# Patient Record
Sex: FEMALE | Race: WHITE | NOT HISPANIC OR LATINO | Employment: FULL TIME | URBAN - METROPOLITAN AREA
[De-identification: names, ages, dates, MRNs, and addresses within clinical notes are randomized per-mention and may not be internally consistent; named-entity substitution may affect disease eponyms.]

---

## 2023-04-09 PROBLEM — N89.8 VAGINAL PRURITUS: Status: ACTIVE | Noted: 2023-04-09

## 2023-04-09 PROBLEM — N95.1 MENOPAUSAL SYMPTOMS: Status: ACTIVE | Noted: 2023-04-09

## 2023-04-09 PROBLEM — L30.9 ECZEMA: Status: ACTIVE | Noted: 2023-04-09

## 2023-04-09 PROBLEM — E34.9 TESTOSTERONE DEFICIENCY: Status: ACTIVE | Noted: 2023-04-09

## 2023-04-09 PROBLEM — G47.00 INSOMNIA: Status: ACTIVE | Noted: 2023-04-09

## 2023-04-09 PROBLEM — R51.9 HEADACHE: Status: ACTIVE | Noted: 2023-04-09

## 2023-04-09 PROBLEM — K58.9 IRRITABLE BOWEL SYNDROME: Status: ACTIVE | Noted: 2023-04-09

## 2023-04-09 PROBLEM — R79.89 ELEVATED TESTOSTERONE LEVEL: Status: ACTIVE | Noted: 2023-04-09

## 2023-04-09 PROBLEM — N76.0 VULVOVAGINITIS: Status: ACTIVE | Noted: 2023-04-09

## 2023-04-09 PROBLEM — J45.909 REACTIVE AIRWAY DISEASE (HHS-HCC): Status: ACTIVE | Noted: 2023-04-09

## 2023-04-09 PROBLEM — N95.2 VAGINAL ATROPHY: Status: ACTIVE | Noted: 2023-04-09

## 2023-04-09 PROBLEM — R23.2 HOT FLASHES: Status: ACTIVE | Noted: 2023-04-09

## 2023-04-09 PROBLEM — R10.2 VAGINAL PAIN: Status: ACTIVE | Noted: 2023-04-09

## 2023-04-09 PROBLEM — L23.7 CONTACT DERMATITIS DUE TO POISON IVY: Status: ACTIVE | Noted: 2023-04-09

## 2023-04-09 PROBLEM — R10.2 VULVAR PAIN: Status: ACTIVE | Noted: 2023-04-09

## 2023-04-09 PROBLEM — L28.0 LICHEN SIMPLEX, CHRONIC: Status: ACTIVE | Noted: 2023-04-09

## 2023-04-09 RX ORDER — ESZOPICLONE 2 MG/1
1 TABLET, FILM COATED ORAL NIGHTLY PRN
COMMUNITY
Start: 2017-06-02 | End: 2023-04-11 | Stop reason: SDUPTHER

## 2023-04-09 RX ORDER — ITRACONAZOLE 100 MG/1
200 CAPSULE ORAL
COMMUNITY
Start: 2018-10-06

## 2023-04-09 RX ORDER — PRASTERONE 6.5 MG/1
1 INSERT VAGINAL NIGHTLY
COMMUNITY
Start: 2021-01-15

## 2023-04-09 RX ORDER — LEVOCETIRIZINE DIHYDROCHLORIDE 5 MG/1
1 TABLET, FILM COATED ORAL EVERY EVENING
COMMUNITY
Start: 2022-10-07 | End: 2023-10-30

## 2023-04-10 ENCOUNTER — OFFICE VISIT (OUTPATIENT)
Dept: PRIMARY CARE | Facility: CLINIC | Age: 58
End: 2023-04-10
Payer: COMMERCIAL

## 2023-04-10 VITALS
BODY MASS INDEX: 20.5 KG/M2 | SYSTOLIC BLOOD PRESSURE: 110 MMHG | RESPIRATION RATE: 18 BRPM | WEIGHT: 130.6 LBS | HEIGHT: 67 IN | OXYGEN SATURATION: 99 % | HEART RATE: 74 BPM | DIASTOLIC BLOOD PRESSURE: 66 MMHG

## 2023-04-10 DIAGNOSIS — L30.9 ECZEMA, UNSPECIFIED TYPE: ICD-10-CM

## 2023-04-10 DIAGNOSIS — G47.00 INSOMNIA, UNSPECIFIED TYPE: Primary | ICD-10-CM

## 2023-04-10 PROCEDURE — 1036F TOBACCO NON-USER: CPT | Performed by: NURSE PRACTITIONER

## 2023-04-10 PROCEDURE — 99214 OFFICE O/P EST MOD 30 MIN: CPT | Performed by: NURSE PRACTITIONER

## 2023-04-10 PROCEDURE — 80307 DRUG TEST PRSMV CHEM ANLYZR: CPT

## 2023-04-10 RX ORDER — METHYLPREDNISOLONE 4 MG/1
TABLET ORAL
Qty: 21 TABLET | Refills: 0 | Status: SHIPPED | OUTPATIENT
Start: 2023-04-10 | End: 2023-04-17

## 2023-04-10 NOTE — PROGRESS NOTES
Subjective   Patient ID: Nelly Lewis is a 57 y.o. female who presents for Rash (Patient in today with concerns of eczema on her face and medication refills. 130).    Had hysterectomy in Dec   Follows bio identical provider to help regulate her estrogen and hormone levels    Will be traveling for work again after not being able to travel due to COVID.  She has to travel to [place].  She previously would have to take lunesta for sleep time change.  She will need refill    She has had eczema to her forearm and face.  She has tried desonide in the past but ran out and would like refill   She has had increased stress and feels this is making her eczema worse    OARRS:  No data recorded  I have personally reviewed the OARRS report for Nelly Lewis. I have considered the risks of abuse, dependence, addiction and diversion    Is the patient prescribed a combination of a benzodiazepine and opioid?  No    Last Urine Drug Screen / ordered today: Yes  Recent Results (from the past 36182 hour(s))   Drug Screen, Urine With Reflex to Confirmation    Collection Time: 04/10/23 12:37 PM   Result Value Ref Range    DRUG SCREEN COMMENT URINE SEE BELOW     Amphetamine Screen, Urine PRESUMPTIVE NEGATIVE NEGATIVE    Barbiturate Screen, Urine PRESUMPTIVE NEGATIVE NEGATIVE    BENZODIAZEPINE (PRESENCE) IN URINE BY SCREEN METHOD PRESUMPTIVE NEGATIVE NEGATIVE    Cannabinoid Screen, Urine PRESUMPTIVE NEGATIVE NEGATIVE    Cocaine Screen, Urine PRESUMPTIVE NEGATIVE NEGATIVE    Fentanyl, Ur PRESUMPTIVE NEGATIVE NEGATIVE    Methadone Screen, Urine PRESUMPTIVE NEGATIVE NEGATIVE    Opiate Screen, Urine PRESUMPTIVE NEGATIVE NEGATIVE    Oxycodone Screen, Ur PRESUMPTIVE NEGATIVE NEGATIVE    PCP Screen, Urine PRESUMPTIVE NEGATIVE NEGATIVE     Results are as expected.     Controlled Substance Agreement:  Date of the Last Agreement: 4/10/23  Reviewed Controlled Substance Agreement including but not limited to the benefits, risks, and alternatives  "to treatment with a Controlled Substance medication(s).    Sleep Aids:   What is the patient's goal of therapy? Increased sleep  Is this being achieved with current treatment? yes    Activities of Daily Living:   Is your overall impression that this patient is benefiting (symptom reduction outweighs side effects) from sleep aid therapy? Yes     1. Physical Functioning: Better  2. Family Relationship: Better  3. Social Relationship: Better  4. Mood: Better  5. Sleep Patterns: Better  6. Overall Function: Better       Review of Systems   Skin:  Positive for rash.   All other systems reviewed and are negative.      Objective   /66   Pulse 74   Resp 18   Ht 1.702 m (5' 7\")   Wt 59.2 kg (130 lb 9.6 oz)   SpO2 99%   BMI 20.45 kg/m²     Physical Exam  Vitals and nursing note reviewed.   Constitutional:       Appearance: Normal appearance. She is normal weight.   HENT:      Head: Normocephalic and atraumatic.      Right Ear: External ear normal.      Left Ear: External ear normal.   Eyes:      Extraocular Movements: Extraocular movements intact.      Conjunctiva/sclera: Conjunctivae normal.      Pupils: Pupils are equal, round, and reactive to light.   Neck:      Vascular: No carotid bruit.   Cardiovascular:      Rate and Rhythm: Normal rate and regular rhythm.      Pulses: Normal pulses.      Heart sounds: Normal heart sounds.   Pulmonary:      Effort: Pulmonary effort is normal.      Breath sounds: Normal breath sounds.   Abdominal:      General: Bowel sounds are normal.   Musculoskeletal:      Cervical back: Normal range of motion.   Lymphadenopathy:      Cervical: No cervical adenopathy.   Skin:     General: Skin is warm and dry.      Comments: Eczema to bilateral forearms and cheeks   Neurological:      General: No focal deficit present.      Mental Status: She is alert.   Psychiatric:         Mood and Affect: Mood normal.         Behavior: Behavior normal.         Thought Content: Thought content normal.   "       Judgment: Judgment normal.         Assessment/Plan   Problem List Items Addressed This Visit          Nervous    Insomnia - Primary     - I have personally reviewed the OARRS report for the patient. This report is scanned into the electronic medical record. I have considered the risks of abuse, dependence, addiction and diversion. I believe that it is clinically appropriate for the patient to be prescribed this medication  -  controlled substance contract reviewed and signed  [facility]  - UDS today  - follow up 6 months          Relevant Orders    POCT waived urine drug screen manually resulted    Drug Screen, Urine With Reflex to Confirmation (Completed)       Infectious/Inflammatory    Eczema     Medrol dose pack  Desonide for face         Relevant Medications    methylPREDNISolone (Medrol Dospak) 4 mg tablets

## 2023-04-11 PROBLEM — R51.9 HEADACHE: Status: RESOLVED | Noted: 2023-04-09 | Resolved: 2023-04-11

## 2023-04-11 PROBLEM — N76.0 VULVOVAGINITIS: Status: RESOLVED | Noted: 2023-04-09 | Resolved: 2023-04-11

## 2023-04-11 PROBLEM — L23.7 CONTACT DERMATITIS DUE TO POISON IVY: Status: RESOLVED | Noted: 2023-04-09 | Resolved: 2023-04-11

## 2023-04-11 PROBLEM — J45.909 REACTIVE AIRWAY DISEASE (HHS-HCC): Status: RESOLVED | Noted: 2023-04-09 | Resolved: 2023-04-11

## 2023-04-11 PROBLEM — N89.8 VAGINAL PRURITUS: Status: RESOLVED | Noted: 2023-04-09 | Resolved: 2023-04-11

## 2023-04-11 PROBLEM — R79.89 ELEVATED TESTOSTERONE LEVEL: Status: RESOLVED | Noted: 2023-04-09 | Resolved: 2023-04-11

## 2023-04-11 PROBLEM — R23.2 HOT FLASHES: Status: RESOLVED | Noted: 2023-04-09 | Resolved: 2023-04-11

## 2023-04-11 PROBLEM — R10.2 VAGINAL PAIN: Status: RESOLVED | Noted: 2023-04-09 | Resolved: 2023-04-11

## 2023-04-11 LAB
AMPHETAMINE (PRESENCE) IN URINE BY SCREEN METHOD: NORMAL
BARBITURATES PRESENCE IN URINE BY SCREEN METHOD: NORMAL
BENZODIAZEPINE (PRESENCE) IN URINE BY SCREEN METHOD: NORMAL
CANNABINOIDS IN URINE BY SCREEN METHOD: NORMAL
COCAINE (PRESENCE) IN URINE BY SCREEN METHOD: NORMAL
DRUG SCREEN COMMENT URINE: NORMAL
FENTANYL URINE: NORMAL
METHADONE (PRESENCE) IN URINE BY SCREEN METHOD: NORMAL
OPIATES (PRESENCE) IN URINE BY SCREEN METHOD: NORMAL
OXYCODONE (PRESENCE) IN URINE BY SCREEN METHOD: NORMAL
PHENCYCLIDINE (PRESENCE) IN URINE BY SCREEN METHOD: NORMAL

## 2023-04-11 RX ORDER — DESONIDE 0.5 MG/G
CREAM TOPICAL 2 TIMES DAILY
Qty: 60 G | Refills: 5 | Status: SHIPPED | OUTPATIENT
Start: 2023-04-11 | End: 2023-11-09

## 2023-04-11 RX ORDER — ESZOPICLONE 2 MG/1
2 TABLET, FILM COATED ORAL NIGHTLY PRN
Qty: 30 TABLET | Refills: 0 | Status: SHIPPED | OUTPATIENT
Start: 2023-04-11 | End: 2023-05-11

## 2023-04-11 NOTE — ASSESSMENT & PLAN NOTE
- I have personally reviewed the OARRS report for the patient. This report is scanned into the electronic medical record. I have considered the risks of abuse, dependence, addiction and diversion. I believe that it is clinically appropriate for the patient to be prescribed this medication  -  controlled substance contract reviewed and signed  - lunesta  - S today  - follow up 6 months

## 2023-10-29 DIAGNOSIS — Z00.00 ENCOUNTER FOR GENERAL ADULT MEDICAL EXAMINATION WITHOUT ABNORMAL FINDINGS: ICD-10-CM

## 2023-10-30 RX ORDER — LEVOCETIRIZINE DIHYDROCHLORIDE 5 MG/1
5 TABLET, FILM COATED ORAL EVERY EVENING
Qty: 30 TABLET | Refills: 0 | Status: SHIPPED | OUTPATIENT
Start: 2023-10-30

## 2023-11-09 ENCOUNTER — APPOINTMENT (OUTPATIENT)
Dept: PRIMARY CARE | Facility: CLINIC | Age: 58
End: 2023-11-09
Payer: COMMERCIAL

## 2023-11-09 ENCOUNTER — OFFICE VISIT (OUTPATIENT)
Dept: PRIMARY CARE | Facility: CLINIC | Age: 58
End: 2023-11-09
Payer: COMMERCIAL

## 2023-11-09 VITALS
BODY MASS INDEX: 20.72 KG/M2 | HEIGHT: 67 IN | HEART RATE: 80 BPM | DIASTOLIC BLOOD PRESSURE: 65 MMHG | SYSTOLIC BLOOD PRESSURE: 99 MMHG | WEIGHT: 132 LBS

## 2023-11-09 DIAGNOSIS — L20.82 FLEXURAL ECZEMA: Primary | ICD-10-CM

## 2023-11-09 PROCEDURE — 1036F TOBACCO NON-USER: CPT | Performed by: NURSE PRACTITIONER

## 2023-11-09 PROCEDURE — 99212 OFFICE O/P EST SF 10 MIN: CPT | Performed by: NURSE PRACTITIONER

## 2023-11-09 RX ORDER — HYDROCORTISONE 25 MG/G
CREAM TOPICAL 2 TIMES DAILY PRN
Qty: 30 G | Refills: 1 | Status: SHIPPED | OUTPATIENT
Start: 2023-11-09 | End: 2024-11-08

## 2023-11-09 RX ORDER — LEVOTHYROXINE SODIUM 25 UG/1
25 TABLET ORAL DAILY
COMMUNITY

## 2023-11-09 NOTE — PROGRESS NOTES
"Nelly Lewis is a 58 y.o. female pt of YAS Reese who presents today for c/o \"eczema flare up\"    Chief Complaint   Patient presents with    SDS     Nelly who is a [facility] patient is here today with c/o eczema flare up . Florence had given a rx in the past which provided no relief. Pt also has tried many OTC medications with  no relief.        Symptoms: Dry skin/Eczema     Pt states symptoms 1st began on her face last year  Rx DesOwen and it has helped, until recently when she developed mult outbreaks to arms/antecubital, lower back and legs.  + itchiness   Pt would like to try hydrocortisone and will begin taking daily antihistamine (Xyzal) as she has not been doing that     Review of Systems  All 13 systems were reviewed and are within normal limits except positive and pertinent negative responses which are noted below or in HPI.        Objective   Vitals:  BP 99/65   Pulse 80   Ht 1.702 m (5' 7\")   Wt 59.9 kg (132 lb)   BMI 20.67 kg/m²         Physical Exam  Skin:               Assessment/Plan   Problem List Items Addressed This Visit             ICD-10-CM    Eczema - Primary L30.9    Relevant Medications    hydrocortisone 2.5 % cream       "

## 2023-11-09 NOTE — PATIENT INSTRUCTIONS
Thank you for seeing me today.  It was a pleasure to meet you!    #ECZEMA  Begin Hydrocortisone cream; DO NOT use on face or groin  Use Aquaphor/Vaseline to face  Begin taking Xyzal every day    RTC AS NEEDED

## 2024-01-03 DIAGNOSIS — L30.9 ECZEMA, UNSPECIFIED TYPE: ICD-10-CM

## 2024-01-03 RX ORDER — DESONIDE 0.5 MG/G
CREAM TOPICAL 2 TIMES DAILY
Qty: 60 G | Refills: 5 | Status: SHIPPED | OUTPATIENT
Start: 2024-01-03 | End: 2025-01-02

## 2024-06-10 ENCOUNTER — HOSPITAL ENCOUNTER (OUTPATIENT)
Dept: RADIOLOGY | Facility: HOSPITAL | Age: 59
Discharge: HOME | End: 2024-06-10
Payer: COMMERCIAL

## 2024-06-10 VITALS — BODY MASS INDEX: 20.09 KG/M2 | HEIGHT: 67 IN | WEIGHT: 128 LBS

## 2024-06-10 DIAGNOSIS — Z12.31 ENCOUNTER FOR SCREENING MAMMOGRAM FOR MALIGNANT NEOPLASM OF BREAST: ICD-10-CM

## 2024-06-10 PROCEDURE — 77063 BREAST TOMOSYNTHESIS BI: CPT

## 2024-06-10 PROCEDURE — 77067 SCR MAMMO BI INCL CAD: CPT | Performed by: RADIOLOGY

## 2024-06-10 PROCEDURE — 77063 BREAST TOMOSYNTHESIS BI: CPT | Performed by: RADIOLOGY

## 2024-08-06 ENCOUNTER — APPOINTMENT (OUTPATIENT)
Dept: PRIMARY CARE | Facility: CLINIC | Age: 59
End: 2024-08-06
Payer: COMMERCIAL

## 2024-08-06 ENCOUNTER — OFFICE VISIT (OUTPATIENT)
Dept: PRIMARY CARE | Facility: CLINIC | Age: 59
End: 2024-08-06
Payer: COMMERCIAL

## 2024-08-06 VITALS
OXYGEN SATURATION: 97 % | HEIGHT: 67 IN | WEIGHT: 129 LBS | BODY MASS INDEX: 20.25 KG/M2 | SYSTOLIC BLOOD PRESSURE: 102 MMHG | DIASTOLIC BLOOD PRESSURE: 65 MMHG | HEART RATE: 73 BPM

## 2024-08-06 DIAGNOSIS — J06.9 UPPER RESPIRATORY TRACT INFECTION, UNSPECIFIED TYPE: Primary | ICD-10-CM

## 2024-08-06 PROCEDURE — 3008F BODY MASS INDEX DOCD: CPT

## 2024-08-06 PROCEDURE — 1036F TOBACCO NON-USER: CPT

## 2024-08-06 PROCEDURE — 99213 OFFICE O/P EST LOW 20 MIN: CPT

## 2024-08-06 RX ORDER — AZITHROMYCIN 250 MG/1
TABLET, FILM COATED ORAL
Qty: 6 TABLET | Refills: 0 | Status: SHIPPED | OUTPATIENT
Start: 2024-08-06 | End: 2024-08-11

## 2024-08-06 ASSESSMENT — ENCOUNTER SYMPTOMS
SHORTNESS OF BREATH: 1
FEVER: 0
HEMOPTYSIS: 0
COUGH: 1
HEARTBURN: 0
WHEEZING: 0
RHINORRHEA: 1
HEADACHES: 0

## 2024-08-06 NOTE — PROGRESS NOTES
"Subjective   Patient ID: Nelly Lewis is a 59 y.o. female who presents for Cough (Cough with phlegm x 4 days nonproductive cough/Upper respiratory congestion,  has had it for 3 wks from traveling with someone ).    Cough  This is a new problem. Episode onset: 7days. The problem has been gradually worsening. The problem occurs every few minutes. The cough is Productive of purulent sputum (productive). Associated symptoms include rhinorrhea and shortness of breath. Pertinent negatives include no ear congestion, ear pain, fever, headaches, heartburn, hemoptysis, nasal congestion, postnasal drip or wheezing. Exacerbated by: was exposed to uri while traveling. Treatments tried: zinc, vit c, vit d. The treatment provided mild relief. There is no history of emphysema, environmental allergies or pneumonia. does have eczema rash        Review of Systems   Constitutional:  Negative for fever.   HENT:  Positive for rhinorrhea. Negative for ear pain and postnasal drip.    Respiratory:  Positive for cough and shortness of breath. Negative for hemoptysis and wheezing.    Gastrointestinal:  Negative for heartburn.   Allergic/Immunologic: Negative for environmental allergies.   Neurological:  Negative for headaches.       Objective   /65   Pulse 73   Ht 1.702 m (5' 7\")   Wt 58.5 kg (129 lb)   SpO2 97%   BMI 20.20 kg/m²     Physical Exam  Vitals and nursing note reviewed.   Constitutional:       General: She is not in acute distress.     Appearance: Normal appearance. She is not ill-appearing.   HENT:      Nose: Congestion present. No rhinorrhea.   Cardiovascular:      Heart sounds: Normal heart sounds.   Pulmonary:      Breath sounds: Normal breath sounds.   Neurological:      Mental Status: She is alert.         Assessment/Plan   Nelly was seen today for cough.  Diagnoses and all orders for this visit:  Upper respiratory tract infection, unspecified type  -     azithromycin (Zithromax) 250 mg tablet; Take " 2 tablets (500 mg) by mouth once daily for 1 day, THEN 1 tablet (250 mg) once daily for 4 days. Take 2 tabs (500 mg) by mouth today, than 1 daily for 4 days..

## 2024-08-06 NOTE — PATIENT INSTRUCTIONS
Drinking plenty of fluids and getting lots of rest. Chicken soup and hot beverages may help.    Trial of nasal irrigation with a Nettipot or squeeze bottle with sterile salt water.    Nasal spray corticosteroids (Flonase) may help in reducing the inflammatory response in the nasal passages and airways. Please try 2 sprays each nostril daily for 2 weeks.  If you have season allergies, please take a daily antihistamine of your choice, such as Zyrtec/Claritin/Allegra at bedtime.    Take Tylenol or Motrin/Aleve for sinus or ear pain.    If you have good blood pressure you can try pseudofed (you must ask the pharmacist for it and show ID).    Please call or return to the office if you are not feeling better in the next 3-4 days after starting treatment.    Over-the-counter cold and cough medications     Take Mucinex for cough, drink plenty of fluids with this medication and will help break up congestion making it easier to cough up     For cough can use honey (children ages 1 and up) in hot tea or hot water. I recommend putting this in an insulated cup and carrying it around throughout the day to sip on.  Have it at your bedside at night in case you wake up coughing.  You can also use honey cough drops (adults and older children).     Recommend nasal saline for use in children and adults.  [place] can also be helpful.  (Never used tap water and a Neti pot.  Use distilled water.)     If you have plugged up congested ears or the feeling of fluid in your ears, you can use an over-the-counter nasal steroid spray like fluticasone (brand name Flonase) use 2 sprays into each nostril once or twice a day for 7 days.  This will help open up the eustachian tubes and allow the fluid to drain out of your ears.     Sleeping with your head/chest elevated can help with sinus drainage.     Adults only-can use nasal decongestant (like Afrin) at bedtime to open nasal passages so you can breathe through your nose while you sleep; avoid  using for longer than 3 days as this medication can become addicting.  Do not use if you have high blood pressure or high heart rate.     For severe pain or fever in adult-Tylenol (2 extra strength) or ibuprofen (3-4 tabs equals 600 to 800 mg) alternating as needed for pain.  Tylenol doses should be 6 to 8 hours apart and ibuprofen doses should be 6 to 8 hours apart.        Common cold medications for adults explained:     Mucinex-(generic name guaifenesin)-is an expectorant.  This will thin out all the thick mucus.  Must drink plenty of liquids for this medicine to work.     Dextromethorphan (brand name Delsym or DM)-this medicine is a cough suppressant     Honey in hot water or tea-this is a natural cough suppressant     Decongestant nasal spray- (eg: Afrin) use for temporary relief of nasal congestion-best when used at bedtime to open up nasal passages so that you are not forced to mouth breathe overnight.     Sudafed (generic name pseudoephedrine-this must be bought from the pharmacist) DO NOT use this medicine if you have high blood pressure as it can raise your blood pressure higher.  Do not use if you have any irregular heart rate.  This medicine can help clear congestion in your sinuses.

## 2024-08-07 ENCOUNTER — APPOINTMENT (OUTPATIENT)
Dept: PRIMARY CARE | Facility: CLINIC | Age: 59
End: 2024-08-07
Payer: COMMERCIAL

## 2024-08-20 ENCOUNTER — APPOINTMENT (OUTPATIENT)
Dept: PRIMARY CARE | Facility: CLINIC | Age: 59
End: 2024-08-20
Payer: COMMERCIAL

## 2024-08-20 VITALS
HEIGHT: 67 IN | SYSTOLIC BLOOD PRESSURE: 107 MMHG | OXYGEN SATURATION: 96 % | DIASTOLIC BLOOD PRESSURE: 71 MMHG | HEART RATE: 67 BPM | BODY MASS INDEX: 20.4 KG/M2 | WEIGHT: 130 LBS

## 2024-08-20 DIAGNOSIS — L20.82 FLEXURAL ECZEMA: ICD-10-CM

## 2024-08-20 DIAGNOSIS — F51.02 ADJUSTMENT INSOMNIA: ICD-10-CM

## 2024-08-20 DIAGNOSIS — Z00.00 HEALTHCARE MAINTENANCE: Primary | ICD-10-CM

## 2024-08-20 DIAGNOSIS — G47.00 INSOMNIA, UNSPECIFIED TYPE: ICD-10-CM

## 2024-08-20 PROCEDURE — 99214 OFFICE O/P EST MOD 30 MIN: CPT | Performed by: NURSE PRACTITIONER

## 2024-08-20 PROCEDURE — 80307 DRUG TEST PRSMV CHEM ANLYZR: CPT

## 2024-08-20 PROCEDURE — 1036F TOBACCO NON-USER: CPT | Performed by: NURSE PRACTITIONER

## 2024-08-20 PROCEDURE — 3008F BODY MASS INDEX DOCD: CPT | Performed by: NURSE PRACTITIONER

## 2024-08-20 PROCEDURE — 99396 PREV VISIT EST AGE 40-64: CPT | Performed by: NURSE PRACTITIONER

## 2024-08-20 RX ORDER — HYDROCORTISONE 25 MG/G
CREAM TOPICAL 2 TIMES DAILY PRN
Qty: 30 G | Refills: 3 | Status: SHIPPED | OUTPATIENT
Start: 2024-08-20 | End: 2025-08-20

## 2024-08-20 RX ORDER — ESZOPICLONE 2 MG/1
2 TABLET, FILM COATED ORAL NIGHTLY PRN
Qty: 30 TABLET | Refills: 1 | Status: SHIPPED | OUTPATIENT
Start: 2024-08-20

## 2024-08-20 ASSESSMENT — PATIENT HEALTH QUESTIONNAIRE - PHQ9
2. FEELING DOWN, DEPRESSED OR HOPELESS: NOT AT ALL
1. LITTLE INTEREST OR PLEASURE IN DOING THINGS: NOT AT ALL
SUM OF ALL RESPONSES TO PHQ9 QUESTIONS 1 AND 2: 0

## 2024-08-20 NOTE — ASSESSMENT & PLAN NOTE
- reviewed labs  -  up to date with immunizations  -  hysterectomy   -  Mammogram UTD   -  screening colonoscopy 2017 due in 2027  -  eat a diet high in lean protein, vegetable, fruits, and low in carbohydrates  -  exercise 20-30 minutes 4-5 days a week  -  Follow up in 6 months

## 2024-08-20 NOTE — PROGRESS NOTES
Subjective   Patient ID: Nelly Lewis is a 59 y.o. female who presents for Med Refill (Hydrocortisone cream and Lunesta ) and Annual Exam.    Well Adult Physical   Patient here for a comprehensive physical exam.The patient reports no problems    Do you take any herbs or supplements that were not prescribed by a doctor? no   Are you taking calcium supplements? no   Are you taking aspirin daily? no     History:  LMP: No LMP recorded. Patient is postmenopausal.  Hysterectomy   : 2  Para: 1 1 stillbirth of twin son    Had hysterectomy in Dec   Follows bio identical provider to help regulate her estrogen and hormone levels    Will be traveling for work again after not being able to travel due to COVID.  She has to travel to [place].  She previously would have to take lunesta for sleep time change.  She will need refillOARRS:  NellyCHRISTIANNE Yang-CNP on 2024  9:15 AM  I have personally reviewed the OARRS report for Nelly Lewis. I have considered the risks of abuse, dependence, addiction and diversion and I believe that it is clinically appropriate for Nelly Lewis to be prescribed this medication    Is the patient prescribed a combination of a benzodiazepine and opioid?  No    Last Urine Drug Screen / ordered today: Yes  No results found for this or any previous visit (from the past 8760 hour(s)).  Results are as expected.       Controlled Substance Agreement:  Date of the Last Agreement: 24  Reviewed Controlled Substance Agreement including but not limited to the benefits, risks, and alternatives to treatment with a Controlled Substance medication(s).    Sleep Aids:   What is the patient's goal of therapy? Increase sleep when traveling  Is this being achieved with current treatment? yes    Activities of Daily Living:   Is your overall impression that this patient is benefiting (symptom reduction outweighs side effects) from sleep aid therapy? No     1. Physical  "Functioning: Better  2. Family Relationship: Better  3. Social Relationship: Better  4. Mood: Better  5. Sleep Patterns: Better  6. Overall Function: Better    Had labs drawn with functional medicine in July - WBC 5.4 - returned to normal.    Mammogram 6/24  Tsh 7/5/24 2.6          She has had eczema to her forearm and face.  She has tried desonide in the past but ran out and would like refill   She has had increased stress and feels this is making her eczema worse  Colonoscopy completed 2017.  She states that completed with GI who is since retired               Review of Systems   All other systems reviewed and are negative.      Objective   /71   Pulse 67   Ht 1.702 m (5' 7\")   Wt 59 kg (130 lb)   SpO2 96%   BMI 20.36 kg/m²     Physical Exam  Vitals and nursing note reviewed.   Constitutional:       General: She is not in acute distress.     Appearance: Normal appearance.   HENT:      Head: Normocephalic and atraumatic.      Right Ear: Tympanic membrane, ear canal and external ear normal.      Left Ear: Tympanic membrane, ear canal and external ear normal.      Nose: Nose normal.      Mouth/Throat:      Mouth: Mucous membranes are moist.      Pharynx: Oropharynx is clear.   Eyes:      Extraocular Movements: Extraocular movements intact.      Conjunctiva/sclera: Conjunctivae normal.      Pupils: Pupils are equal, round, and reactive to light.   Neck:      Vascular: No carotid bruit.   Cardiovascular:      Rate and Rhythm: Normal rate and regular rhythm.      Pulses: Normal pulses.      Heart sounds: Normal heart sounds.   Pulmonary:      Effort: Pulmonary effort is normal.      Breath sounds: Normal breath sounds.   Abdominal:      General: Bowel sounds are normal. There is no distension.      Palpations: Abdomen is soft.      Tenderness: There is no abdominal tenderness.   Musculoskeletal:         General: Normal range of motion.      Cervical back: Normal range of motion and neck supple.      Right " lower leg: No edema.      Left lower leg: No edema.   Lymphadenopathy:      Cervical: No cervical adenopathy.   Skin:     General: Skin is warm and dry.      Capillary Refill: Capillary refill takes less than 2 seconds.      Comments: Patch of eczema noted to left posterior shoulder   Neurological:      General: No focal deficit present.      Mental Status: She is alert and oriented to person, place, and time. Mental status is at baseline.      Motor: No weakness.   Psychiatric:         Mood and Affect: Mood normal.         Behavior: Behavior normal.         Thought Content: Thought content normal.         Judgment: Judgment normal.         Assessment/Plan   Problem List Items Addressed This Visit             ICD-10-CM    Eczema L30.9     Hydrocortisone refilled         Relevant Medications    hydrocortisone 2.5 % cream    Insomnia - Primary G47.00     - I have personally reviewed the OARRS report for the patient. This report is scanned into the electronic medical record. I have considered the risks of abuse, dependence, addiction and diversion. I believe that it is clinically appropriate for the patient to be prescribed this medication  -  controlled substance contract reviewed and signed  - lunesta  - UDS today  - follow up 6 months          Relevant Medications    eszopiclone (Lunesta) 2 mg tablet    Other Relevant Orders    Drug Screen, Urine With Reflex to Confirmation    Healthcare maintenance Z00.00     - reviewed labs  -  up to date with immunizations  -  hysterectomy   -  Mammogram UTD   -  screening colonoscopy 2017 due in 2027  -  eat a diet high in lean protein, vegetable, fruits, and low in carbohydrates  -  exercise 20-30 minutes 4-5 days a week  -  Follow up in 6 months

## 2024-08-20 NOTE — PATIENT INSTRUCTIONS
Follow up in 6 months  I sent in the lunesta  Your cholesterol is great   Your WBC has returned to normal  Follow up with Erna Campbell NP

## 2024-08-21 LAB
AMPHETAMINES UR QL SCN: NORMAL
BARBITURATES UR QL SCN: NORMAL
BENZODIAZ UR QL SCN: NORMAL
BZE UR QL SCN: NORMAL
CANNABINOIDS UR QL SCN: NORMAL
FENTANYL+NORFENTANYL UR QL SCN: NORMAL
METHADONE UR QL SCN: NORMAL
OPIATES UR QL SCN: NORMAL
OXYCODONE+OXYMORPHONE UR QL SCN: NORMAL
PCP UR QL SCN: NORMAL

## 2025-02-18 ENCOUNTER — APPOINTMENT (OUTPATIENT)
Dept: PRIMARY CARE | Facility: CLINIC | Age: 60
End: 2025-02-18
Payer: COMMERCIAL

## 2025-02-18 VITALS
OXYGEN SATURATION: 99 % | HEIGHT: 67 IN | HEART RATE: 72 BPM | BODY MASS INDEX: 20.25 KG/M2 | SYSTOLIC BLOOD PRESSURE: 123 MMHG | DIASTOLIC BLOOD PRESSURE: 78 MMHG | WEIGHT: 129 LBS

## 2025-02-18 DIAGNOSIS — K58.0 IRRITABLE BOWEL SYNDROME WITH DIARRHEA: ICD-10-CM

## 2025-02-18 DIAGNOSIS — L30.9 ECZEMA, UNSPECIFIED TYPE: Primary | ICD-10-CM

## 2025-02-18 DIAGNOSIS — E03.9 ACQUIRED HYPOTHYROIDISM: ICD-10-CM

## 2025-02-18 DIAGNOSIS — K90.9 DIARRHEA DUE TO MALABSORPTION (HHS-HCC): ICD-10-CM

## 2025-02-18 DIAGNOSIS — E46 MALNUTRITION, UNSPECIFIED TYPE (MULTI): ICD-10-CM

## 2025-02-18 DIAGNOSIS — K90.9 INTESTINAL MALABSORPTION, UNSPECIFIED TYPE (HHS-HCC): ICD-10-CM

## 2025-02-18 DIAGNOSIS — R19.7 DIARRHEA DUE TO MALABSORPTION (HHS-HCC): ICD-10-CM

## 2025-02-18 PROCEDURE — 99214 OFFICE O/P EST MOD 30 MIN: CPT | Performed by: NURSE PRACTITIONER

## 2025-02-18 PROCEDURE — 3008F BODY MASS INDEX DOCD: CPT | Performed by: NURSE PRACTITIONER

## 2025-02-18 ASSESSMENT — PATIENT HEALTH QUESTIONNAIRE - PHQ9
2. FEELING DOWN, DEPRESSED OR HOPELESS: NOT AT ALL
SUM OF ALL RESPONSES TO PHQ9 QUESTIONS 1 AND 2: 0
1. LITTLE INTEREST OR PLEASURE IN DOING THINGS: NOT AT ALL

## 2025-02-18 NOTE — PROGRESS NOTES
"Subjective   Patient ID: Nelly Lewis is a 59 y.o. female who presents for Follow-up (Patient is here today for a follow up on her eczema and a transition of care to [place].).    59 year old female here for routine follow up  IBS- hypnotherapy- gut directed therapy. No progress. Probiotics don't help. Kacia fiber helps. Pea protein helps. Had GB tested, EGD- no BX. Starting to affect her QOL. Not doing what she wants because her stools are frequent. Approx 6-8 stools a day. Colitis ruled out  Eczema- random patches that will eventually turn into thickened and discolored skin. Dye free, fragrance free, no chemicals. Off dairy- had a food sensitivity test. Hydrocort does not work  Clobetasol cream helps but only uses sparingly  Testosterone and estrogen pellets, progesterone. Bio identical pellets. This helped with vulvar and vestibular itching  Hypothyroid- on T4 25 mcg. TSH- 2.6; FT4 1.1, FT3 2.7  Homocysteine high- 8.7         Review of Systems    Objective   /78   Pulse 72   Ht 1.702 m (5' 7\")   Wt 58.5 kg (129 lb)   SpO2 99%   BMI 20.20 kg/m²     Physical Exam  Constitutional:       Appearance: Normal appearance.   Cardiovascular:      Rate and Rhythm: Normal rate and regular rhythm.      Pulses: Normal pulses.      Heart sounds: Normal heart sounds.   Pulmonary:      Effort: Pulmonary effort is normal.      Breath sounds: Normal breath sounds.   Musculoskeletal:      Cervical back: Normal range of motion and neck supple.   Lymphadenopathy:      Cervical: No cervical adenopathy.   Skin:     Comments: Small scattered    Neurological:      General: No focal deficit present.      Mental Status: She is alert and oriented to person, place, and time.   Psychiatric:         Mood and Affect: Mood normal.         Behavior: Behavior normal.         Thought Content: Thought content normal.         Assessment/Plan   Diagnoses and all orders for this visit:  Eczema, unspecified type  -     Sedimentation Rate; " Future  -     C-reactive protein; Future  -     Magnesium; Future  -     Celiac Panel; Future  Acquired hypothyroidism  Comments:  not sure she really needs the T4  Orders:  -     Thyroid Peroxidase (TPO) Antibody; Future  -     TSH with reflex to Free T4 if abnormal; Future  -     Magnesium; Future  -     Thyroxine, Free; Future  -     Triiodothyronine, Free; Future  Irritable bowel syndrome with diarrhea  Comments:  chornic long standing. anxiety may be playing a role. we discussed this  Orders:  -     Vitamin B12; Future  -     Vitamin D 25-Hydroxy,Total (for eval of Vitamin D levels); Future  -     Celiac Panel; Future  -     Folate; Future  Malnutrition, unspecified type (Multi)  Comments:  this i sposs if she is not absorbing nutrients due to the amt of diarrhea  Orders:  -     Celiac Panel; Future  Diarrhea due to malabsorption ([facility]-H[facility])  -     Vitamin B12; Future  -     Vitamin D 25-Hydroxy,Total (for eval of Vitamin D levels); Future  -     Celiac Panel; Future  -     Folate; Future  Intestinal malabsorption, unspecified type ([facility]HS-HCC)  Comments:  less is more. I think she does not need all of the supplements she is currently taking. she will bring bottles with her next OV  Orders:  -     Vitamin B12; Future  -     Vitamin D 25-Hydroxy,Total (for eval of Vitamin D levels); Future  -     Celiac Panel; Future  -     Folate; Future

## 2025-02-20 LAB
25(OH)D3+25(OH)D2 SERPL-MCNC: 30 NG/ML (ref 30–100)
CRP SERPL-MCNC: <3 MG/L
ERYTHROCYTE [SEDIMENTATION RATE] IN BLOOD BY WESTERGREN METHOD: 6 MM/H
FOLATE SERPL-MCNC: 18.4 NG/ML
GLIADIN IGA SER IA-ACNC: <1 U/ML
GLIADIN IGG SER IA-ACNC: <1 U/ML
IGA SERPL-MCNC: 217 MG/DL (ref 47–310)
MAGNESIUM SERPL-MCNC: 2.4 MG/DL (ref 1.5–2.5)
T3FREE SERPL-MCNC: 3 PG/ML (ref 2.3–4.2)
T4 FREE SERPL-MCNC: 1.3 NG/DL (ref 0.8–1.8)
THYROPEROXIDASE AB SERPL-ACNC: 1 IU/ML
TSH SERPL-ACNC: 3.51 MIU/L (ref 0.4–4.5)
TTG IGA SER-ACNC: <1 U/ML
TTG IGG SER-ACNC: <1 U/ML
VIT B12 SERPL-MCNC: 497 PG/ML (ref 200–1100)

## 2025-02-27 ENCOUNTER — APPOINTMENT (OUTPATIENT)
Dept: PRIMARY CARE | Facility: CLINIC | Age: 60
End: 2025-02-27
Payer: COMMERCIAL

## 2025-02-27 VITALS
HEIGHT: 67 IN | BODY MASS INDEX: 20.4 KG/M2 | OXYGEN SATURATION: 100 % | SYSTOLIC BLOOD PRESSURE: 132 MMHG | WEIGHT: 130 LBS | DIASTOLIC BLOOD PRESSURE: 76 MMHG | HEART RATE: 75 BPM

## 2025-02-27 DIAGNOSIS — G47.00 INSOMNIA, UNSPECIFIED TYPE: ICD-10-CM

## 2025-02-27 DIAGNOSIS — E55.9 VITAMIN D DEFICIENCY: ICD-10-CM

## 2025-02-27 DIAGNOSIS — K90.9 INTESTINAL MALABSORPTION, UNSPECIFIED TYPE (HHS-HCC): Primary | ICD-10-CM

## 2025-02-27 DIAGNOSIS — E53.8 VITAMIN B12 DEFICIENCY: ICD-10-CM

## 2025-02-27 PROCEDURE — 99214 OFFICE O/P EST MOD 30 MIN: CPT | Performed by: NURSE PRACTITIONER

## 2025-02-27 PROCEDURE — 3008F BODY MASS INDEX DOCD: CPT | Performed by: NURSE PRACTITIONER

## 2025-02-27 PROCEDURE — 1036F TOBACCO NON-USER: CPT | Performed by: NURSE PRACTITIONER

## 2025-02-27 RX ORDER — ESZOPICLONE 1 MG/1
1 TABLET, FILM COATED ORAL NIGHTLY PRN
Qty: 14 TABLET | Refills: 0 | Status: SHIPPED | OUTPATIENT
Start: 2025-02-27 | End: 2025-10-25

## 2025-02-27 NOTE — PROGRESS NOTES
"Subjective   Patient ID: Nelly Lewis is a 59 y.o. female who presents for Follow-up (1 week FUV to discuss labs).    59-year-old female here to follow-up on recent lab work results.  Everything looks very good except she has a low vitamin D and a low B12.  When I compared to her previous which she brought in with her these levels have actually come up compared to a year ago.    She brought a list of the supplements that she takes.  I reviewed them with her   She states she received a prescription from her previous PCP and it can last a year that is how rare she will use it.  Travels via plane a lot for work. She uses Lunesta just on the plane, she is asking if she could have a couple of tablets because she is going to be flying soon  OARRS report reviewed no red flags  We talked about her chronic diarrhea she is a couple supplement as with possible side effects that can cause diarrhea one of them is zinc.  She takes that daily and has been on it long-term.         Review of Systems    Objective   /76   Pulse 75   Ht 1.702 m (5' 7\")   Wt 59 kg (130 lb)   SpO2 100%   BMI 20.36 kg/m²     Physical Exam  Constitutional:       Appearance: Normal appearance.   Neurological:      General: No focal deficit present.      Mental Status: She is alert and oriented to person, place, and time. Mental status is at baseline.   Psychiatric:         Mood and Affect: Mood normal.         Behavior: Behavior normal.         Thought Content: Thought content normal.         Assessment/Plan   Diagnoses and all orders for this visit:  Intestinal malabsorption, unspecified type ([facility])  Comments:  Stop the zinc supplement and she can try the pancreatic enzyme she wants it called into a compounding pharmacy she gave me the name and number  Orders:  -     DHEA level; Future  Insomnia, unspecified type  -     eszopiclone (Lunesta) 1 mg tablet; Take 1 tablet (1 mg) by mouth as needed at bedtime for sleep. Take immediately before " bedtime  Vitamin D deficiency  Comments:  Increase the supplement she can take it as a liquid which is sublingual  Vitamin B12 deficiency  Comments:  Already on a B12 supplement and wondering if she is not holding it under the tongue long enough.  This was discussed with her  Other orders  -     Follow Up In Primary Care - Established; Future

## 2025-06-19 DIAGNOSIS — Z82.49 FAMILY HISTORY OF ARTERIOSCLEROTIC CARDIOVASCULAR DISEASE: ICD-10-CM

## 2025-06-19 DIAGNOSIS — E78.00 PURE HYPERCHOLESTEROLEMIA: Primary | ICD-10-CM

## 2025-06-25 ENCOUNTER — PATIENT MESSAGE (OUTPATIENT)
Dept: PRIMARY CARE | Facility: CLINIC | Age: 60
End: 2025-06-25
Payer: COMMERCIAL

## 2025-06-25 DIAGNOSIS — E78.5 HYPERLIPIDEMIA, UNSPECIFIED HYPERLIPIDEMIA TYPE: Primary | ICD-10-CM

## 2025-06-25 DIAGNOSIS — Z12.31 BREAST CANCER SCREENING BY MAMMOGRAM: ICD-10-CM

## 2025-06-26 LAB
APO B SERPL-MCNC: 129 MG/DL
CHOLEST SERPL-MCNC: 314 MG/DL
CHOLEST/HDLC SERPL: 3.4 (CALC)
CRP SERPL HS-MCNC: 0.6 MG/L
HDLC SERPL-MCNC: 92 MG/DL
LDLC SERPL CALC-MCNC: 204 MG/DL (CALC)
NONHDLC SERPL-MCNC: 222 MG/DL (CALC)
TRIGL SERPL-MCNC: 72 MG/DL

## 2025-07-07 ENCOUNTER — APPOINTMENT (OUTPATIENT)
Dept: RADIOLOGY | Facility: HOSPITAL | Age: 60
End: 2025-07-07
Payer: COMMERCIAL

## 2025-07-07 VITALS — WEIGHT: 130 LBS | BODY MASS INDEX: 20.4 KG/M2 | HEIGHT: 67 IN

## 2025-07-07 DIAGNOSIS — Z12.31 BREAST CANCER SCREENING BY MAMMOGRAM: ICD-10-CM

## 2025-07-07 PROCEDURE — 77063 BREAST TOMOSYNTHESIS BI: CPT | Performed by: RADIOLOGY

## 2025-07-07 PROCEDURE — 77067 SCR MAMMO BI INCL CAD: CPT | Performed by: RADIOLOGY

## 2025-07-07 PROCEDURE — 77067 SCR MAMMO BI INCL CAD: CPT

## 2025-08-12 ENCOUNTER — APPOINTMENT (OUTPATIENT)
Dept: PRIMARY CARE | Facility: CLINIC | Age: 60
End: 2025-08-12
Payer: COMMERCIAL

## 2025-08-12 VITALS
SYSTOLIC BLOOD PRESSURE: 107 MMHG | OXYGEN SATURATION: 98 % | WEIGHT: 125 LBS | DIASTOLIC BLOOD PRESSURE: 72 MMHG | HEART RATE: 75 BPM | HEIGHT: 67 IN | BODY MASS INDEX: 19.62 KG/M2

## 2025-08-12 DIAGNOSIS — K90.9 DIARRHEA DUE TO MALABSORPTION (HHS-HCC): ICD-10-CM

## 2025-08-12 DIAGNOSIS — L30.8 OTHER ECZEMA: Primary | ICD-10-CM

## 2025-08-12 DIAGNOSIS — R19.7 DIARRHEA DUE TO MALABSORPTION (HHS-HCC): ICD-10-CM

## 2025-08-12 PROCEDURE — 3008F BODY MASS INDEX DOCD: CPT | Performed by: NURSE PRACTITIONER

## 2025-08-12 PROCEDURE — 1036F TOBACCO NON-USER: CPT | Performed by: NURSE PRACTITIONER

## 2025-08-12 PROCEDURE — 99214 OFFICE O/P EST MOD 30 MIN: CPT | Performed by: NURSE PRACTITIONER

## 2025-08-12 RX ORDER — DESONIDE 0.5 MG/G
CREAM TOPICAL 2 TIMES DAILY
Qty: 15 G | Refills: 1 | Status: SHIPPED | OUTPATIENT
Start: 2025-08-12 | End: 2026-08-12

## 2025-08-12 ASSESSMENT — PATIENT HEALTH QUESTIONNAIRE - PHQ9
SUM OF ALL RESPONSES TO PHQ9 QUESTIONS 1 AND 2: 0
1. LITTLE INTEREST OR PLEASURE IN DOING THINGS: NOT AT ALL
2. FEELING DOWN, DEPRESSED OR HOPELESS: NOT AT ALL

## 2025-08-12 ASSESSMENT — ENCOUNTER SYMPTOMS
LOSS OF SENSATION IN FEET: 0
OCCASIONAL FEELINGS OF UNSTEADINESS: 0
DEPRESSION: 0

## 2025-08-26 ENCOUNTER — HOSPITAL ENCOUNTER (OUTPATIENT)
Dept: RADIOLOGY | Facility: CLINIC | Age: 60
Discharge: HOME | End: 2025-08-26
Payer: COMMERCIAL

## 2025-08-26 DIAGNOSIS — E78.5 HYPERLIPIDEMIA, UNSPECIFIED HYPERLIPIDEMIA TYPE: ICD-10-CM

## 2025-08-26 PROCEDURE — 75571 CT HRT W/O DYE W/CA TEST: CPT

## 2025-08-27 ENCOUNTER — OFFICE VISIT (OUTPATIENT)
Dept: CARDIOLOGY | Facility: HOSPITAL | Age: 60
End: 2025-08-27
Payer: COMMERCIAL

## 2025-08-27 VITALS
WEIGHT: 127.65 LBS | HEART RATE: 71 BPM | DIASTOLIC BLOOD PRESSURE: 75 MMHG | BODY MASS INDEX: 19.99 KG/M2 | SYSTOLIC BLOOD PRESSURE: 121 MMHG | OXYGEN SATURATION: 100 %

## 2025-08-27 DIAGNOSIS — Z00.00 HEALTHCARE MAINTENANCE: ICD-10-CM

## 2025-08-27 DIAGNOSIS — E78.5 DYSLIPIDEMIA: ICD-10-CM

## 2025-08-27 LAB
ATRIAL RATE: 78 BPM
P AXIS: 78 DEGREES
P OFFSET: 190 MS
P ONSET: 141 MS
PR INTERVAL: 164 MS
Q ONSET: 223 MS
QRS COUNT: 13 BEATS
QRS DURATION: 84 MS
QT INTERVAL: 388 MS
QTC CALCULATION(BAZETT): 442 MS
QTC FREDERICIA: 423 MS
R AXIS: -17 DEGREES
T AXIS: 59 DEGREES
T OFFSET: 417 MS
VENTRICULAR RATE: 78 BPM

## 2025-08-27 PROCEDURE — 93005 ELECTROCARDIOGRAM TRACING: CPT | Performed by: INTERNAL MEDICINE

## 2025-08-27 PROCEDURE — 99212 OFFICE O/P EST SF 10 MIN: CPT
